# Patient Record
Sex: FEMALE
[De-identification: names, ages, dates, MRNs, and addresses within clinical notes are randomized per-mention and may not be internally consistent; named-entity substitution may affect disease eponyms.]

---

## 2023-11-06 ENCOUNTER — DASHBOARD ENCOUNTERS (OUTPATIENT)
Age: 53
End: 2023-11-06

## 2023-11-06 ENCOUNTER — OFFICE VISIT (OUTPATIENT)
Dept: URBAN - METROPOLITAN AREA CLINIC 113 | Facility: CLINIC | Age: 53
End: 2023-11-06
Payer: MEDICAID

## 2023-11-06 ENCOUNTER — LAB OUTSIDE AN ENCOUNTER (OUTPATIENT)
Dept: URBAN - METROPOLITAN AREA CLINIC 113 | Facility: CLINIC | Age: 53
End: 2023-11-06

## 2023-11-06 VITALS
HEIGHT: 63 IN | HEART RATE: 87 BPM | BODY MASS INDEX: 33.66 KG/M2 | RESPIRATION RATE: 16 BRPM | DIASTOLIC BLOOD PRESSURE: 99 MMHG | SYSTOLIC BLOOD PRESSURE: 175 MMHG | TEMPERATURE: 97.7 F | WEIGHT: 190 LBS

## 2023-11-06 DIAGNOSIS — R19.7 DIARRHEA: ICD-10-CM

## 2023-11-06 PROCEDURE — 99244 OFF/OP CNSLTJ NEW/EST MOD 40: CPT | Performed by: NURSE PRACTITIONER

## 2023-11-06 PROCEDURE — 99204 OFFICE O/P NEW MOD 45 MIN: CPT | Performed by: NURSE PRACTITIONER

## 2023-11-06 RX ORDER — GABAPENTIN 300 MG/1
1 CAPSULE CAPSULE ORAL ONCE A DAY
Status: ACTIVE | COMMUNITY

## 2023-11-06 RX ORDER — LORATADINE 10 MG
1 TABLET TABLET ORAL ONCE A DAY
Status: ACTIVE | COMMUNITY

## 2023-11-06 RX ORDER — OMEPRAZOLE 20 MG/1
1 CAPSULE 30 MINUTES BEFORE MORNING MEAL CAPSULE, DELAYED RELEASE ORAL ONCE A DAY
Status: ACTIVE | COMMUNITY

## 2023-11-06 RX ORDER — ARIPIPRAZOLE 15 MG/1
1 TABLET TABLET ORAL ONCE A DAY
Status: ACTIVE | COMMUNITY

## 2023-11-06 RX ORDER — FUROSEMIDE 20 MG/1
1 TABLET TABLET ORAL ONCE A DAY
Status: ACTIVE | COMMUNITY

## 2023-11-06 RX ORDER — SIMVASTATIN 40 MG
1 TABLET IN THE EVENING TABLET ORAL ONCE A DAY
Status: ACTIVE | COMMUNITY

## 2023-11-06 RX ORDER — POLYETHYLENE GLYCOL 3350, SODIUM SULFATE, SODIUM CHLORIDE, POTASSIUM CHLORIDE, ASCORBIC ACID, SODIUM ASCORBATE 140-9-5.2G
AS DIRECTED KIT ORAL ONCE
Qty: 140 GRAMS | Refills: 0 | OUTPATIENT
Start: 2023-11-06 | End: 2023-11-07

## 2023-11-06 NOTE — HPI-TODAY'S VISIT:
54yo female with a history of hypertension, hyperlipidemia, prediabetes, schizophrenia, anxiety, referred by Shannon Mena NP for colon cancer screening. A copy of this document is being forwarded to the referring provider.  She reports a 1-2 week history of an increase in stool frequency. At baseline she has 1 bowel movement per day, but more recently has been having 4-5 stools per day. The stool is not always loose. She reports associated nighttime bowel movements. She has occasional red blood on the toilet paper with wiping which she attributes to hemorrhoids. She complains of lower abdominal discomfort which is not related to meals but subsides with a bowel movement. No nausea, vomiting, or weight loss. She denies any new medications or recent antibiotic use. Her last colonoscopy was performed about 10 years ago in [place] and was normal per report. She smokes 2ppd.

## 2024-01-02 ENCOUNTER — TELEPHONE ENCOUNTER (OUTPATIENT)
Dept: URBAN - METROPOLITAN AREA CLINIC 113 | Facility: CLINIC | Age: 54
End: 2024-01-02

## 2024-01-10 ENCOUNTER — TELEPHONE ENCOUNTER (OUTPATIENT)
Dept: URBAN - METROPOLITAN AREA CLINIC 113 | Facility: CLINIC | Age: 54
End: 2024-01-10

## 2024-01-10 ENCOUNTER — OFFICE VISIT (OUTPATIENT)
Dept: URBAN - METROPOLITAN AREA SURGERY CENTER 25 | Facility: SURGERY CENTER | Age: 54
End: 2024-01-10

## 2024-02-01 ENCOUNTER — OV EP (OUTPATIENT)
Dept: URBAN - METROPOLITAN AREA CLINIC 113 | Facility: CLINIC | Age: 54
End: 2024-02-01

## 2024-02-27 ENCOUNTER — OV EP (OUTPATIENT)
Dept: URBAN - METROPOLITAN AREA CLINIC 113 | Facility: CLINIC | Age: 54
End: 2024-02-27

## 2024-07-10 ENCOUNTER — OFFICE VISIT (OUTPATIENT)
Dept: URBAN - METROPOLITAN AREA CLINIC 107 | Facility: CLINIC | Age: 54
End: 2024-07-10
Payer: MEDICAID

## 2024-07-10 ENCOUNTER — LAB OUTSIDE AN ENCOUNTER (OUTPATIENT)
Dept: URBAN - METROPOLITAN AREA CLINIC 107 | Facility: CLINIC | Age: 54
End: 2024-07-10

## 2024-07-10 VITALS
TEMPERATURE: 97.7 F | RESPIRATION RATE: 18 BRPM | WEIGHT: 203 LBS | OXYGEN SATURATION: 97 % | SYSTOLIC BLOOD PRESSURE: 121 MMHG | BODY MASS INDEX: 35.97 KG/M2 | HEART RATE: 94 BPM | DIASTOLIC BLOOD PRESSURE: 76 MMHG | HEIGHT: 63 IN

## 2024-07-10 DIAGNOSIS — R19.7 DIARRHEA: ICD-10-CM

## 2024-07-10 DIAGNOSIS — D50.8 ACHLORHYDRIC ANEMIA: ICD-10-CM

## 2024-07-10 DIAGNOSIS — K62.5 BRIGHT RED BLOOD PER RECTUM: ICD-10-CM

## 2024-07-10 DIAGNOSIS — K21.9 GERD: ICD-10-CM

## 2024-07-10 PROCEDURE — 99214 OFFICE O/P EST MOD 30 MIN: CPT | Performed by: NURSE PRACTITIONER

## 2024-07-10 RX ORDER — LORATADINE 10 MG
1 TABLET TABLET ORAL ONCE A DAY
Status: ACTIVE | COMMUNITY

## 2024-07-10 RX ORDER — SIMVASTATIN 40 MG
1 TABLET IN THE EVENING TABLET ORAL ONCE A DAY
Status: ACTIVE | COMMUNITY

## 2024-07-10 RX ORDER — OMEPRAZOLE 20 MG/1
1 CAPSULE 30 MINUTES BEFORE MORNING MEAL CAPSULE, DELAYED RELEASE ORAL ONCE A DAY
Status: ACTIVE | COMMUNITY

## 2024-07-10 RX ORDER — OMEPRAZOLE 20 MG/1
1 CAPSULE 30 MINUTES BEFORE MORNING MEAL CAPSULE, DELAYED RELEASE ORAL ONCE A DAY
OUTPATIENT

## 2024-07-10 RX ORDER — FUROSEMIDE 20 MG/1
1 TABLET TABLET ORAL ONCE A DAY
Status: ACTIVE | COMMUNITY

## 2024-07-10 RX ORDER — GABAPENTIN 300 MG/1
1 CAPSULE CAPSULE ORAL ONCE A DAY
Status: ACTIVE | COMMUNITY

## 2024-07-10 RX ORDER — ARIPIPRAZOLE 15 MG/1
1 TABLET TABLET ORAL ONCE A DAY
Status: ACTIVE | COMMUNITY

## 2024-07-10 NOTE — HPI-TODAY'S VISIT:
She was seen in the office in November for evaluation of diarrhea.  Stool studies were planned with consideration for trial of WelChol for possible bile salt induced diarrhea status post cholecystectomy.  A screening colonoscopy was recommended, with consideration for bringing colon biopsies.  She did not proceed with stool studies or colonoscopy at that time. She returns today with similar complaint.  She complains of intermittent diarrhea which has been ongoing for the last year.  She was recently on multiple antibiotics for a vaginal infection, including doxycycline, metronidazole, and possible amoxicillin and the diarrhea has markedly improved.  She had 1 loose stool yesterday.  She complains of intermittent red blood per rectum which is worsened during episodes of diarrhea.  She has occasional right-sided abdominal pain and frequent heartburn despite low dose omeprazole. Recent labs with her PCP showed marked iron deficiency.  She recently received IV iron x 2.  She reports that multiple recent ultrasounds and a CT of the abdomen were negative. Stool studies 6/3/2024 per her PCP:Negative stool culture, O/P, C. difficile toxin. Labs with her PCP 5/30/2024:H/H12.4/39.3, MCV 79.4, , WBC 7.97.  CMP unremarkable with normal LFTs.  Iron 45.10, TIBC 442.70, iron sat 10.  Ferritin 10.3.  CRP less than 0.5.  Normal TSH.  Negative viral hepatitis panel. Stool studies 2/5/2024 including C. difficile toxin negative.  Negative Hemoccult on 2/5/2024.

## 2024-07-10 NOTE — HPI-TODAY'S VISIT:
52yo female with a history of hypertension, hyperlipidemia, prediabetes, schizophrenia, anxiety, presenting for follow-up of diarrhea and rectal bleeding.

## 2024-07-23 ENCOUNTER — OFFICE VISIT (OUTPATIENT)
Dept: URBAN - METROPOLITAN AREA SURGERY CENTER 25 | Facility: SURGERY CENTER | Age: 54
End: 2024-07-23

## 2024-08-14 ENCOUNTER — OFFICE VISIT (OUTPATIENT)
Dept: URBAN - METROPOLITAN AREA CLINIC 107 | Facility: CLINIC | Age: 54
End: 2024-08-14

## 2024-09-20 ENCOUNTER — OFFICE VISIT (OUTPATIENT)
Dept: URBAN - METROPOLITAN AREA CLINIC 113 | Facility: CLINIC | Age: 54
End: 2024-09-20
Payer: MEDICAID

## 2024-09-20 ENCOUNTER — LAB OUTSIDE AN ENCOUNTER (OUTPATIENT)
Dept: URBAN - METROPOLITAN AREA CLINIC 113 | Facility: CLINIC | Age: 54
End: 2024-09-20

## 2024-09-20 ENCOUNTER — TELEPHONE ENCOUNTER (OUTPATIENT)
Dept: URBAN - METROPOLITAN AREA CLINIC 113 | Facility: CLINIC | Age: 54
End: 2024-09-20

## 2024-09-20 VITALS
WEIGHT: 199 LBS | SYSTOLIC BLOOD PRESSURE: 147 MMHG | HEART RATE: 78 BPM | HEIGHT: 63 IN | DIASTOLIC BLOOD PRESSURE: 103 MMHG | BODY MASS INDEX: 35.26 KG/M2 | RESPIRATION RATE: 18 BRPM | TEMPERATURE: 98.1 F

## 2024-09-20 DIAGNOSIS — K62.5 BRIGHT RED BLOOD PER RECTUM: ICD-10-CM

## 2024-09-20 DIAGNOSIS — D50.8 ACHLORHYDRIC ANEMIA: ICD-10-CM

## 2024-09-20 DIAGNOSIS — R10.84 GENERALIZED ABDOMINAL PAIN: ICD-10-CM

## 2024-09-20 DIAGNOSIS — K21.9 GERD: ICD-10-CM

## 2024-09-20 DIAGNOSIS — R19.7 DIARRHEA: ICD-10-CM

## 2024-09-20 PROCEDURE — 99214 OFFICE O/P EST MOD 30 MIN: CPT | Performed by: NURSE PRACTITIONER

## 2024-09-20 RX ORDER — POLYETHYLENE GLYCOL 3350, SODIUM CHLORIDE, SODIUM BICARBONATE, POTASSIUM CHLORIDE 420; 11.2; 5.72; 1.48 G/4L; G/4L; G/4L; G/4L
AS DIRECTED POWDER, FOR SOLUTION ORAL
OUTPATIENT
Start: 2024-09-20

## 2024-09-20 RX ORDER — GABAPENTIN 300 MG/1
1 CAPSULE CAPSULE ORAL ONCE A DAY
Status: ACTIVE | COMMUNITY

## 2024-09-20 RX ORDER — OMEPRAZOLE 20 MG/1
1 CAPSULE 30 MINUTES BEFORE MORNING MEAL CAPSULE, DELAYED RELEASE ORAL ONCE A DAY
OUTPATIENT

## 2024-09-20 RX ORDER — ARIPIPRAZOLE 15 MG/1
1 TABLET TABLET ORAL ONCE A DAY
Status: ACTIVE | COMMUNITY

## 2024-09-20 RX ORDER — SIMVASTATIN 40 MG
1 TABLET IN THE EVENING TABLET ORAL ONCE A DAY
Status: ON HOLD | COMMUNITY

## 2024-09-20 RX ORDER — OMEPRAZOLE 20 MG/1
1 CAPSULE 30 MINUTES BEFORE MORNING MEAL CAPSULE, DELAYED RELEASE ORAL ONCE A DAY
Status: ACTIVE | COMMUNITY

## 2024-09-20 RX ORDER — POLYETHYLENE GLYCOL 3350, SODIUM CHLORIDE, SODIUM BICARBONATE, POTASSIUM CHLORIDE 420; 11.2; 5.72; 1.48 G/4L; G/4L; G/4L; G/4L
AS DIRECTED POWDER, FOR SOLUTION ORAL
Qty: 420 GRAM | Refills: 0 | OUTPATIENT
Start: 2024-09-20 | End: 2024-09-21

## 2024-09-20 RX ORDER — FUROSEMIDE 20 MG/1
1 TABLET TABLET ORAL ONCE A DAY
Status: ON HOLD | COMMUNITY

## 2024-09-20 RX ORDER — LORATADINE 10 MG
1 TABLET TABLET ORAL ONCE A DAY
Status: ACTIVE | COMMUNITY

## 2024-09-20 NOTE — HPI-TODAY'S VISIT:
She was seen in the office in July for follow-up regarding diarrhea, which had improved following multiple rounds of antibiotics for vaginal infection.  Given associated red blood per rectum and progressive iron deficiency requiring IV iron, a colonoscopy was planned to evaluate for underlying colorectal pathology.  An upper endoscopy was considered.  She was to continue omeprazole for GERD. She ultimately canceled the colonoscopy due to an upper respiratory infection and bronchitis.  Her diarrhea has been worsening following antibiotic therapy.  She states she is awakening around 2 to 3 AM due to diarrhea, having multiple loose stools throughout the morning.  She is having 6 or more abdominal extremity.  He states the stool appears like coffee grounds.  She does see red blood with wiping on occasion.  She complains of abdominal cramping associated with stool urgency.  Her PCP gave her dicyclomine but she was unable to tolerate this due to abdominal bloating.

## 2024-09-20 NOTE — HPI-OTHER HISTORIES
IV iron x 2 in June/July 2024.  Stool studies 6/3/2024 per her PCP:Negative stool culture, O/P, C. difficile toxin.  Labs with her PCP 5/30/2024:H/H12.4/39.3, MCV 79.4, , WBC 7.97. CMP unremarkable with normal LFTs. Iron 45.10, TIBC 442.70, iron sat 10. Ferritin 10.3. CRP less than 0.5. Normal TSH. Negative viral hepatitis panel.  Stool studies 2/5/2024 including C. difficile toxin negative. Negative Hemoccult on 2/5/2024.  Abdominal ultrasound 8/23/23 was unremarkable.

## 2024-09-21 LAB
A/G RATIO: 1.5
ABSOLUTE BASOPHILS: 72
ABSOLUTE EOSINOPHILS: 79
ABSOLUTE LYMPHOCYTES: 2117
ABSOLUTE MONOCYTES: 418
ABSOLUTE NEUTROPHILS: 4514
ALBUMIN: 4.3
ALKALINE PHOSPHATASE: 100
ALT (SGPT): 24
AST (SGOT): 15
BASOPHILS: 1
BILIRUBIN, TOTAL: 0.2
BUN/CREATININE RATIO: (no result)
BUN: 13
CALCIUM: 9.9
CARBON DIOXIDE, TOTAL: 20
CHLORIDE: 102
CREATININE: 0.88
EGFR: 79
EOSINOPHILS: 1.1
FERRITIN, SERUM: 420
GLOBULIN, TOTAL: 2.8
GLUCOSE: 109
HEMATOCRIT: 47.1
HEMOGLOBIN: 15.3
IRON BIND.CAP.(TIBC): 309
IRON SATURATION: 29
IRON: 91
LIPASE: 32
LYMPHOCYTES: 29.4
MCH: 28.5
MCHC: 32.5
MCV: 87.9
MONOCYTES: 5.8
MPV: 9.1
NEUTROPHILS: 62.7
PLATELET COUNT: 342
POTASSIUM: 4.5
PROTEIN, TOTAL: 7.1
RDW: 16.9
RED BLOOD CELL COUNT: 5.36
SODIUM: 136
WHITE BLOOD CELL COUNT: 7.2

## 2024-10-04 ENCOUNTER — TELEPHONE ENCOUNTER (OUTPATIENT)
Dept: URBAN - METROPOLITAN AREA CLINIC 113 | Facility: CLINIC | Age: 54
End: 2024-10-04

## 2024-10-07 ENCOUNTER — TELEPHONE ENCOUNTER (OUTPATIENT)
Dept: URBAN - METROPOLITAN AREA CLINIC 113 | Facility: CLINIC | Age: 54
End: 2024-10-07

## 2024-10-09 ENCOUNTER — TELEPHONE ENCOUNTER (OUTPATIENT)
Dept: URBAN - METROPOLITAN AREA CLINIC 113 | Facility: CLINIC | Age: 54
End: 2024-10-09

## 2024-10-14 ENCOUNTER — TELEPHONE ENCOUNTER (OUTPATIENT)
Dept: URBAN - METROPOLITAN AREA CLINIC 113 | Facility: CLINIC | Age: 54
End: 2024-10-14

## 2024-10-14 RX ORDER — POLYETHYLENE GLYCOL 3350, SODIUM CHLORIDE, SODIUM BICARBONATE, POTASSIUM CHLORIDE 420; 11.2; 5.72; 1.48 G/4L; G/4L; G/4L; G/4L
AS DIRECTED POWDER, FOR SOLUTION ORAL AS DIRECTED
Qty: 1 KIT | Refills: 0 | OUTPATIENT
Start: 2024-10-14 | End: 2024-10-16

## 2024-10-17 ENCOUNTER — TELEPHONE ENCOUNTER (OUTPATIENT)
Dept: URBAN - METROPOLITAN AREA CLINIC 113 | Facility: CLINIC | Age: 54
End: 2024-10-17

## 2024-11-05 ENCOUNTER — TELEPHONE ENCOUNTER (OUTPATIENT)
Dept: URBAN - METROPOLITAN AREA CLINIC 113 | Facility: CLINIC | Age: 54
End: 2024-11-05